# Patient Record
Sex: FEMALE | Race: WHITE | NOT HISPANIC OR LATINO | URBAN - METROPOLITAN AREA
[De-identification: names, ages, dates, MRNs, and addresses within clinical notes are randomized per-mention and may not be internally consistent; named-entity substitution may affect disease eponyms.]

---
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
## 2019-01-17 DIAGNOSIS — D89.49 OTHER MAST CELL ACTIVATION DISORDER (H): ICD-10-CM

## 2019-01-17 DIAGNOSIS — T78.1XXD ADVERSE REACTION TO FOOD, SUBSEQUENT ENCOUNTER: ICD-10-CM

## 2019-01-17 DIAGNOSIS — E50.9 VITAMIN A DEFICIENCY: ICD-10-CM

## 2019-01-17 DIAGNOSIS — G47.09 OTHER INSOMNIA: ICD-10-CM

## 2019-01-17 PROCEDURE — 36415 COLL VENOUS BLD VENIPUNCTURE: CPT | Performed by: INTERNAL MEDICINE

## 2019-01-17 PROCEDURE — 84590 ASSAY OF VITAMIN A: CPT | Performed by: INTERNAL MEDICINE

## 2019-01-17 PROCEDURE — 84630 ASSAY OF ZINC: CPT | Performed by: INTERNAL MEDICINE

## 2019-01-17 PROCEDURE — 82525 ASSAY OF COPPER: CPT | Performed by: INTERNAL MEDICINE

## 2019-01-18 LAB
COPPER SERPL-MCNC: 154 UG/DL (ref 80–155)
ZINC SERPL-MCNC: 71 UG/DL (ref 60–120)

## 2019-01-19 LAB
ANNOTATION COMMENT IMP: NORMAL
RETINYL PALMITATE SERPL-MCNC: <0.02 MG/L (ref 0–0.1)
VIT A SERPL-MCNC: 0.69 MG/L (ref 0.3–1.2)

## 2020-02-08 ENCOUNTER — HEALTH MAINTENANCE LETTER (OUTPATIENT)
Age: 20
End: 2020-02-08

## 2020-11-07 ENCOUNTER — HEALTH MAINTENANCE LETTER (OUTPATIENT)
Age: 20
End: 2020-11-07

## 2021-03-27 ENCOUNTER — HEALTH MAINTENANCE LETTER (OUTPATIENT)
Age: 21
End: 2021-03-27

## 2021-09-05 ENCOUNTER — HEALTH MAINTENANCE LETTER (OUTPATIENT)
Age: 21
End: 2021-09-05

## 2022-04-17 ENCOUNTER — HEALTH MAINTENANCE LETTER (OUTPATIENT)
Age: 22
End: 2022-04-17

## 2022-10-23 ENCOUNTER — HEALTH MAINTENANCE LETTER (OUTPATIENT)
Age: 22
End: 2022-10-23

## 2023-06-01 ENCOUNTER — HEALTH MAINTENANCE LETTER (OUTPATIENT)
Age: 23
End: 2023-06-01

## 2025-04-30 NOTE — PATIENT INSTRUCTIONS
[facility]- Pediatric Dermatology  Dr. Cathi Child, Dr. Mira Carr, Dr. aMrlene Lawrence, Dr. Micki Benavides, Dr. Fadi Philippe       Pediatric Appointment Scheduling and Call Center [phone]     Non Urgent -Triage Voicemail Line; [phone]- Luna and Sharon RN's. Messages are checked periodically throughout the day and are returned as soon as possible.      Clinic Fax number: [phone]    If you need a prescription refill, please contact your pharmacy. They will send us an electronic request. Refills are approved or denied by our Physicians during normal business hours, Monday through Fridays    Per office policy, refills will not be granted if you have not been seen within the past year (or sooner depending on your child's condition)    *Radiology Scheduling- [phone]  *Sedation Unit Scheduling- [phone]  *[facility] Scheduling- General [phone]; Pediatric Dermatology [phone]  *Main  Services: [phone]   Eritrean: [phone]   Afghan: [phone]   Hmong/Nauruan/Mikey: [phone]    For urgent matters that cannot wait until the next business day, is over a holiday and/or a weekend please call [phone] and ask for the Dermatology Resident On-Call to be paged.        Weaver's nevus follow up:  No concerns with the nevus today,continue to monitor and follow up with questions or concerns.     Patch on stomach- you can put the triamcinolone ointment on this area twice daily, apply your moisturizer over it. You should apply a moisturizer twice daily every day of the year. Hair regrowth was seen on exam today. Continue to monitor. This also could be a result of the iron deficiency or a combination of both. The triamcinolone was refilled today. You don't need to use the triamcinolone every day. You don't want to over use this topical steroid. You should be using this twice daily when the areas are red, rough and raised. Otherwise  twice daily moisturizers should be applied every day.     Continue your birth control   Talk to the cardiologist about restarting the glycopyrrolate tablets.   You can try going back to applying the drysol     Telogen effluvium- likely the hair loss was caused from your recent stressors This should resolve over time.     Continue to monitor your nails. If you have on-going issues please let us know.     Follow up with Dr. Lawrence in 6 months     Pediatric Dermatology  [facility]  [address]  [place] [address] [phone]    Gentle Skin Care  Below is a list of products our providers recommend for gentle skin care.  Moisturizers:    Lighter; Cetaphil Cream, CeraVe, Aveeno and Vanicream Light     Thicker; Aquaphor Ointment, Vaseline, Petrolium Jelly, Eucerin and Vanicream    Avoid Lotions (too thin)  Mild Cleansers:    Dove- Fragrance Free    CeraVe     Vanicream Cleansing Bar    Cetaphil Cleanser     Aquaphor 2 in1 Gentle Wash and Shampoo       Laundry Products:    All Free and Clear    Cheer Free    Generic Brands are okay as long as they are  Fragrance Free      Avoid fabric softeners  and dryer sheets   Sunscreens: SPF 30 or greater     Sunscreens that contain Zinc Oxide or Titanium Dioxide should be applied, these are physical blockers. Spray or  chemical  sunscreens should be avoided.        Shampoo and Conditioners:    Free and Clear by Vanicream    Aquaphor 2 in 1 Gentle Wash and Shampoo    California Baby  super sensitive   Oils:    Mineral Oil     Emu Oil     For some patients, coconut and sunflower seed oil      Generic Products are an okay substitute, but make sure they are fragrance free.  *Avoid product that have fragrance added to them. Organic does not mean  fragrance free.  In fact patients with sensitive skin can become quite irritated by organic products.     1. Daily bathing is recommended. Make sure you are applying a good moisturizer after bathing every  "time.  2. Use Moisturizing creams at least twice daily to the whole body. Your provider may recommend a lighter or heavier moisturizer based on your child s severity and that time of year it is.  3. Creams are more moisturizing than lotions  4. Products should be fragrance free- soaps, creams, detergents.  Products such as Joey and Joey as well as the Cetaphil \"Baby\" line contain fragrance and may irritate your child's sensitive skin.    Care Plan:  1. Keep bathing and showering short, less than 15 minutes   2. Always use lukewarm warm when possible. AVOID very HOT or COLD water  3. DO NOT use bubble bath  4. Limit the use of soaps. Focus on the skin folds, face, armpits, groin and feet  5. Do NOT vigorously scrub when you cleanse your skin  6. After bathing, PAT your skin lightly with a towel. DO NOT rub or scrub when drying  7. ALWAYS apply a moisturizer immediately after bathing. This helps to  lock in  the moisture. * IF YOU WERE PRESCRIBED A TOPICAL MEDICATION, APPLY YOUR MEDICATION FIRST THEN COVER WITH YOUR DAILY MOISTURIZER  8. Reapply moisturizing agents at least twice daily to your whole body  9. Do not use products such as powders, perfumes, or colognes on your skin  10. Avoid saunas and steam baths. This temperature is too HOT  11. Avoid tight or  scratchy  clothing such as wool  12. Always wash new clothing before wearing them for the first time  13. Sometimes a humidifier or vaporizer can be used at night can help the dry skin. Remember to keep it clean to avoid mold growth.      " PAST MEDICAL HISTORY:  No pertinent past medical history